# Patient Record
Sex: FEMALE | Race: WHITE | NOT HISPANIC OR LATINO | Employment: OTHER | URBAN - METROPOLITAN AREA
[De-identification: names, ages, dates, MRNs, and addresses within clinical notes are randomized per-mention and may not be internally consistent; named-entity substitution may affect disease eponyms.]

---

## 2023-04-05 LAB
CALCIDIOL (25 OH VITAMIN D3) (NG/ML) IN SER/PLAS: 41 NG/ML
CHOLESTEROL (MG/DL) IN SER/PLAS: 145 MG/DL (ref 0–199)
CHOLESTEROL IN HDL (MG/DL) IN SER/PLAS: 60.5 MG/DL
CHOLESTEROL/HDL RATIO: 2.4
LDL: 72 MG/DL (ref 0–99)
TRIGLYCERIDE (MG/DL) IN SER/PLAS: 63 MG/DL (ref 0–149)
VLDL: 13 MG/DL (ref 0–40)

## 2023-10-24 PROBLEM — Q79.0 BOCHDALEK HERNIA (HHS-HCC): Status: ACTIVE | Noted: 2023-10-24

## 2023-10-24 PROBLEM — M85.80 OSTEOPENIA: Status: ACTIVE | Noted: 2023-10-24

## 2023-10-24 PROBLEM — L82.1 OTHER SEBORRHEIC KERATOSIS: Status: ACTIVE | Noted: 2017-07-24

## 2023-10-24 PROBLEM — R20.0 NUMBNESS OF FOOT: Status: ACTIVE | Noted: 2023-10-24

## 2023-10-24 PROBLEM — D12.6 ADENOMATOUS COLON POLYP: Status: ACTIVE | Noted: 2023-10-24

## 2023-10-24 PROBLEM — D18.01 HEMANGIOMA OF SKIN AND SUBCUTANEOUS TISSUE: Status: ACTIVE | Noted: 2017-07-24

## 2023-10-24 PROBLEM — M76.821 POSTERIOR TIBIAL TENDON DYSFUNCTION (PTTD) OF RIGHT LOWER EXTREMITY: Status: ACTIVE | Noted: 2023-10-24

## 2023-10-24 PROBLEM — M21.611 HALLUX VALGUS WITH BUNIONS OF RIGHT FOOT: Status: ACTIVE | Noted: 2023-10-24

## 2023-10-24 PROBLEM — M20.11 HALLUX VALGUS WITH BUNIONS OF RIGHT FOOT: Status: ACTIVE | Noted: 2023-10-24

## 2023-10-24 PROBLEM — E78.5 HYPERLIPIDEMIA: Status: ACTIVE | Noted: 2023-10-24

## 2023-10-24 PROBLEM — M41.9 SCOLIOSIS: Status: ACTIVE | Noted: 2023-10-24

## 2023-10-24 PROBLEM — L57.9 SKIN CHANGES DUE TO CHRONIC EXPOSURE TO NONIONIZING RADIATION, UNSPECIFIED: Status: ACTIVE | Noted: 2017-07-24

## 2023-10-24 PROBLEM — R13.10 DYSPHAGIA: Status: ACTIVE | Noted: 2023-10-24

## 2023-10-24 PROBLEM — N28.1 RENAL CYST: Status: ACTIVE | Noted: 2023-10-24

## 2023-10-24 PROBLEM — K21.9 GERD (GASTROESOPHAGEAL REFLUX DISEASE): Status: ACTIVE | Noted: 2023-10-24

## 2023-10-24 PROBLEM — R41.89 COGNITIVE CHANGES: Status: ACTIVE | Noted: 2023-10-24

## 2023-10-24 RX ORDER — ATORVASTATIN CALCIUM 10 MG/1
1 TABLET, FILM COATED ORAL DAILY
COMMUNITY
Start: 2022-12-01 | End: 2024-03-25 | Stop reason: SDUPTHER

## 2023-10-24 RX ORDER — ONDANSETRON 4 MG/1
1-2 TABLET, ORALLY DISINTEGRATING ORAL EVERY 12 HOURS PRN
COMMUNITY
Start: 2022-05-24 | End: 2023-10-25 | Stop reason: ALTCHOICE

## 2023-10-24 RX ORDER — MULTIVITAMIN
1 TABLET ORAL DAILY
COMMUNITY

## 2023-10-24 RX ORDER — ONDANSETRON 4 MG/1
2 TABLET, FILM COATED ORAL EVERY 8 HOURS PRN
COMMUNITY

## 2023-10-24 RX ORDER — CALC/MAG/B COMPLEX/D3/HERB 61
15 TABLET ORAL DAILY
COMMUNITY

## 2023-10-24 RX ORDER — FAMOTIDINE 40 MG/1
40 TABLET, FILM COATED ORAL DAILY PRN
COMMUNITY
Start: 2019-10-29

## 2023-10-25 PROBLEM — N60.19 FIBROCYSTIC BREAST: Status: ACTIVE | Noted: 2023-10-25

## 2023-10-26 ENCOUNTER — OFFICE VISIT (OUTPATIENT)
Dept: PRIMARY CARE | Facility: CLINIC | Age: 73
End: 2023-10-26
Payer: COMMERCIAL

## 2023-10-26 VITALS
HEART RATE: 65 BPM | BODY MASS INDEX: 23.85 KG/M2 | DIASTOLIC BLOOD PRESSURE: 70 MMHG | TEMPERATURE: 97.9 F | WEIGHT: 130.4 LBS | SYSTOLIC BLOOD PRESSURE: 134 MMHG

## 2023-10-26 DIAGNOSIS — H91.90 HEARING DIFFICULTY, UNSPECIFIED LATERALITY: ICD-10-CM

## 2023-10-26 DIAGNOSIS — Z00.00 MEDICARE ANNUAL WELLNESS VISIT, SUBSEQUENT: ICD-10-CM

## 2023-10-26 DIAGNOSIS — Z12.31 ENCOUNTER FOR SCREENING MAMMOGRAM FOR MALIGNANT NEOPLASM OF BREAST: ICD-10-CM

## 2023-10-26 DIAGNOSIS — R13.10 DYSPHAGIA, UNSPECIFIED TYPE: ICD-10-CM

## 2023-10-26 DIAGNOSIS — E78.49 OTHER HYPERLIPIDEMIA: Primary | ICD-10-CM

## 2023-10-26 DIAGNOSIS — K21.9 GASTROESOPHAGEAL REFLUX DISEASE WITHOUT ESOPHAGITIS: ICD-10-CM

## 2023-10-26 DIAGNOSIS — M41.20 OTHER IDIOPATHIC SCOLIOSIS, UNSPECIFIED SPINAL REGION: ICD-10-CM

## 2023-10-26 DIAGNOSIS — N28.1 RENAL CYST: ICD-10-CM

## 2023-10-26 PROCEDURE — 1160F RVW MEDS BY RX/DR IN RCRD: CPT | Performed by: INTERNAL MEDICINE

## 2023-10-26 PROCEDURE — 90715 TDAP VACCINE 7 YRS/> IM: CPT | Performed by: INTERNAL MEDICINE

## 2023-10-26 PROCEDURE — 1126F AMNT PAIN NOTED NONE PRSNT: CPT | Performed by: INTERNAL MEDICINE

## 2023-10-26 PROCEDURE — 90471 IMMUNIZATION ADMIN: CPT | Performed by: INTERNAL MEDICINE

## 2023-10-26 PROCEDURE — 90472 IMMUNIZATION ADMIN EACH ADD: CPT | Performed by: INTERNAL MEDICINE

## 2023-10-26 PROCEDURE — 99397 PER PM REEVAL EST PAT 65+ YR: CPT | Performed by: INTERNAL MEDICINE

## 2023-10-26 PROCEDURE — 90662 IIV NO PRSV INCREASED AG IM: CPT | Performed by: INTERNAL MEDICINE

## 2023-10-26 PROCEDURE — 1159F MED LIST DOCD IN RCRD: CPT | Performed by: INTERNAL MEDICINE

## 2023-10-26 NOTE — PROGRESS NOTES
Subjective   Patient ID:   [id number]   [id number]   Claudia Maria is a 72 y.o. female who presents for her annual exam.    HPI     72 year old female here noting her son and his wife moved to [place] and they are expecting in April.  His wife has had some issues.  They moved to her father in law apartment at [facility].  Their daughter is doing well and has lost 30 pounds.  She will be going with her sister for a garden tour in June 2024.      How do you feel?  Marleny?  Trip to [place]?  Had a great time.    Caretaking of ?  Exercise?  She has been walking, less often than she prefers but back at it.  She takes a line dancing class at [facility] and is happy to have resumed after many years.  She also takes classes on Fridays at [facility].  They have encore 55 which basically means you can take up to 6 classes from 3 which is $6.00 per class. They go for 6 weeks.  She is taking What is Art?  She is taking a Caravaggio class and then 2 exercise classes.    She started to take prunes to help with bone health    ROS were reviewed and are negative with the exception of what is noted in HPI    There were no vitals taken for this visit.  Objective   Physical Exam  HENT:      Head: Normocephalic and atraumatic.      Right Ear: Tympanic membrane normal.      Left Ear: Tympanic membrane normal.      Mouth/Throat:      Mouth: Mucous membranes are moist.   Eyes:      Extraocular Movements: Extraocular movements intact.      Pupils: Pupils are equal, round, and reactive to light.   Cardiovascular:      Rate and Rhythm: Normal rate and regular rhythm.      Heart sounds: No murmur heard.     No friction rub. No gallop.   Pulmonary:      Effort: Pulmonary effort is normal.      Breath sounds: No stridor. No wheezing or rales.   Abdominal:      Palpations: Abdomen is soft.      Tenderness: There is no abdominal tenderness.   Musculoskeletal:         General: No swelling.      Cervical back: Neck supple.   Skin:     General: Skin  is warm and dry.   Neurological:      Mental Status: She is alert.         Assessment/Plan   Problem List Items Addressed This Visit    None      Provider Impressions     1. Esophageal spasm/GERD - omeprazole or famotidine for daily use PRN. EGD 2003, 2015 without evidence of esophageal disease. EGD 11/22 with only hiatal hernia.    2. BOCHDALEK HERNIA -her  discussed with Negrita and chose no therapy necessary.   3. Bunion, high arches, Mononeuropathy right foot - had frostbite in the past. She saw Madeleine in past who recommended orthotics to start.   4. Osteopenia - bone densitometry has progressed compared with 8/18 with FRAX score in the range she should be treated. Wanted her to start alendronate 70 mg weekly but she is reticent and has not started.  Referral to endocrine for extra input. Also encourage outdoor exercise, calcium intake and watch her vitamin D. REpeat dexa June 2024.  5.  Borderline BP - today BP is okay.  Encourage her to check occasionally.  Plan to treat if >130/80.  Encourage healthy behavior          6. Renal cyst - ultrasound 6/18 with 1.3 cm cyst with septa, Bosniak category II means no followup. SHe is followed by urology now.   7. Anxiety/SAD - mood better. Her daughter's happiness affects her own   8. Fibrocystic breast - mammograms regularly. LAst one 5/23.   9. Hearing loss - referral to audiology for regular exams.   10. Lung nodules, pleuro-parenchymal scarring - looks like old infection. Not high risk would not check further at this point   11. Family history of atherosclerotic disease, hyperlipidemia - coronary CT calcium score of ZERO 6/17!!!! On statin and tolerates well.   12. Family history of ovarian disease although at age 91 in mom. F/U PAP per GYN  13. Scoliosis  14. Sessile serrated adenoma - 2015 colonoscopy, repeat November 2022 with polyp and repeat recommended in 2029. Ondansetron helped with prep.   15. Palpitations - not bothering her. 3 runs highest 6  beats   16. Health maintenance   - immunizations - Prevnar 20 done, at pharmacy had flu, covid and rsv discussed  .- Colonoscopy 2022 repeat 2029,   - Mammogram 05/23, repeat ordered.   - Bone densitometry 8/18 and 6/2022. .      Tahira Franklin MD

## 2023-10-26 NOTE — PATIENT INSTRUCTIONS
Patient Discussion/Summary     1. Good to see you today  2. Another exciting trip ahead.   3. Lets have you talk to an endocrine doctor to talk about bone health  4. Repeat bone density June 2024.   4. Glad you are doing so well with the statin.   5. Your blood pressure was good today but reasonable to keep checking every now and then.  Our goal is to keep the pressure <130/80.   6. On exam today you look healthy with normal heart, lungs, some crunching of the knees and slight fluid bulge right knee  7. Please get your MONOVALENT COVID vaccine and also discuss the RSV with Chuckie  8. High dose flu and Tdap today  9.  Audiology referral  10. Lets see each other in one year unless you need me sooner.

## 2024-03-25 ENCOUNTER — TELEPHONE (OUTPATIENT)
Dept: PRIMARY CARE | Facility: CLINIC | Age: 74
End: 2024-03-25
Payer: COMMERCIAL

## 2024-03-25 DIAGNOSIS — E78.49 OTHER HYPERLIPIDEMIA: ICD-10-CM

## 2024-03-25 RX ORDER — ATORVASTATIN CALCIUM 10 MG/1
10 TABLET, FILM COATED ORAL DAILY
Qty: 90 TABLET | Refills: 2 | Status: SHIPPED | OUTPATIENT
Start: 2024-03-25

## 2024-06-10 ENCOUNTER — APPOINTMENT (OUTPATIENT)
Dept: OBSTETRICS AND GYNECOLOGY | Facility: CLINIC | Age: 74
End: 2024-06-10
Payer: COMMERCIAL

## 2024-06-27 ENCOUNTER — APPOINTMENT (OUTPATIENT)
Dept: OBSTETRICS AND GYNECOLOGY | Facility: CLINIC | Age: 74
End: 2024-06-27
Payer: COMMERCIAL

## 2024-06-27 VITALS
BODY MASS INDEX: 22.5 KG/M2 | DIASTOLIC BLOOD PRESSURE: 72 MMHG | SYSTOLIC BLOOD PRESSURE: 156 MMHG | HEIGHT: 63 IN | WEIGHT: 127 LBS

## 2024-06-27 DIAGNOSIS — Z01.419 NORMAL GYNECOLOGIC EXAMINATION: ICD-10-CM

## 2024-06-27 DIAGNOSIS — L29.2 VULVAR ITCHING: Primary | ICD-10-CM

## 2024-06-27 DIAGNOSIS — Z12.31 BREAST CANCER SCREENING BY MAMMOGRAM: ICD-10-CM

## 2024-06-27 PROCEDURE — 99214 OFFICE O/P EST MOD 30 MIN: CPT | Performed by: OBSTETRICS & GYNECOLOGY

## 2024-06-27 PROCEDURE — 1036F TOBACCO NON-USER: CPT | Performed by: OBSTETRICS & GYNECOLOGY

## 2024-06-27 PROCEDURE — 1126F AMNT PAIN NOTED NONE PRSNT: CPT | Performed by: OBSTETRICS & GYNECOLOGY

## 2024-06-27 PROCEDURE — 1159F MED LIST DOCD IN RCRD: CPT | Performed by: OBSTETRICS & GYNECOLOGY

## 2024-06-27 PROCEDURE — 1160F RVW MEDS BY RX/DR IN RCRD: CPT | Performed by: OBSTETRICS & GYNECOLOGY

## 2024-06-27 RX ORDER — CLOBETASOL PROPIONATE 0.5 MG/G
OINTMENT TOPICAL 2 TIMES DAILY
Qty: 15 G | Refills: 1 | Status: SHIPPED | OUTPATIENT
Start: 2024-06-27 | End: 2024-08-26

## 2024-06-27 ASSESSMENT — ENCOUNTER SYMPTOMS
ALLERGIC/IMMUNOLOGIC NEGATIVE: 1
MUSCULOSKELETAL NEGATIVE: 1
HEMATOLOGIC/LYMPHATIC NEGATIVE: 1
EYES NEGATIVE: 1
LOSS OF SENSATION IN FEET: 1
PSYCHIATRIC NEGATIVE: 1
NEUROLOGICAL NEGATIVE: 1
OCCASIONAL FEELINGS OF UNSTEADINESS: 0
CARDIOVASCULAR NEGATIVE: 1
RESPIRATORY NEGATIVE: 1
CONSTITUTIONAL NEGATIVE: 1
ENDOCRINE NEGATIVE: 1
GASTROINTESTINAL NEGATIVE: 1
DEPRESSION: 0

## 2024-06-27 ASSESSMENT — PAIN SCALES - GENERAL: PAINLEVEL: 0-NO PAIN

## 2024-06-27 NOTE — PROGRESS NOTES
Subjective   Patient ID: Claudia Maria is a 73 y.o. female who presents for Annual Exam (Last pap:  6/8/2023  neg/neg/Last giovani:  5/25/2023  cat 2/Last colon screen:  7/31/2018/Walter alvares.   Gabriela Roberts LPN).  HPI patient is here for annual visit she has no complaints except occasional irritation between on perineum between the rectum and vagina that comes and goes    Review of Systems   Constitutional: Negative.    Eyes: Negative.    Respiratory: Negative.     Cardiovascular: Negative.    Gastrointestinal: Negative.    Endocrine: Negative.    Genitourinary: Negative.    Musculoskeletal: Negative.    Skin: Negative.    Allergic/Immunologic: Negative.    Neurological: Negative.    Hematological: Negative.    Psychiatric/Behavioral: Negative.         Objective   Physical Exam  Constitutional:       Appearance: Normal appearance.   HENT:      Head: Normocephalic and atraumatic.   Cardiovascular:      Rate and Rhythm: Normal rate and regular rhythm.      Pulses: Normal pulses.      Heart sounds: Normal heart sounds.   Pulmonary:      Effort: Pulmonary effort is normal.      Breath sounds: Normal breath sounds.   Abdominal:      General: Abdomen is flat. Bowel sounds are normal.      Palpations: Abdomen is soft.      Hernia: There is no hernia in the left inguinal area or right inguinal area.   Genitourinary:     General: Normal vulva.      Exam position: Lithotomy position.      Labia:         Right: No rash, tenderness or lesion.         Left: No rash, tenderness or lesion.       Urethra: No prolapse.      Vagina: Normal.      Cervix: Normal.      Uterus: Normal.       Adnexa: Right adnexa normal and left adnexa normal.      Comments: Vagina atrophic  Musculoskeletal:      Cervical back: Normal range of motion and neck supple.   Skin:     General: Skin is warm and dry.   Neurological:      General: No focal deficit present.      Mental Status: She is alert and oriented to person, place, and time.          Assessment/Plan    vulvar lesion vulvar itching  Normal gynecologic examination  mammogram         Ant Yancey MD 06/27/24 11:36 AM

## 2024-07-03 ENCOUNTER — HOSPITAL ENCOUNTER (OUTPATIENT)
Dept: RADIOLOGY | Facility: HOSPITAL | Age: 74
Discharge: HOME | End: 2024-07-03
Payer: COMMERCIAL

## 2024-07-03 VITALS — BODY MASS INDEX: 22.5 KG/M2 | WEIGHT: 126.98 LBS | HEIGHT: 63 IN

## 2024-07-03 DIAGNOSIS — Z12.31 BREAST CANCER SCREENING BY MAMMOGRAM: ICD-10-CM

## 2024-07-03 PROCEDURE — 77067 SCR MAMMO BI INCL CAD: CPT

## 2024-11-07 ENCOUNTER — APPOINTMENT (OUTPATIENT)
Dept: PRIMARY CARE | Facility: CLINIC | Age: 74
End: 2024-11-07
Payer: COMMERCIAL

## 2024-11-11 ENCOUNTER — APPOINTMENT (OUTPATIENT)
Dept: PRIMARY CARE | Facility: CLINIC | Age: 74
End: 2024-11-11
Payer: COMMERCIAL

## 2024-11-12 ENCOUNTER — OFFICE VISIT (OUTPATIENT)
Dept: PRIMARY CARE | Facility: CLINIC | Age: 74
End: 2024-11-12
Payer: COMMERCIAL

## 2024-11-12 VITALS
SYSTOLIC BLOOD PRESSURE: 146 MMHG | HEIGHT: 62 IN | WEIGHT: 127 LBS | BODY MASS INDEX: 23.37 KG/M2 | HEART RATE: 68 BPM | DIASTOLIC BLOOD PRESSURE: 86 MMHG

## 2024-11-12 DIAGNOSIS — H91.90 HEARING DIFFICULTY, UNSPECIFIED LATERALITY: ICD-10-CM

## 2024-11-12 DIAGNOSIS — M85.80 OSTEOPENIA, UNSPECIFIED LOCATION: Primary | ICD-10-CM

## 2024-11-12 DIAGNOSIS — E78.49 OTHER HYPERLIPIDEMIA: ICD-10-CM

## 2024-11-12 DIAGNOSIS — K21.9 GASTROESOPHAGEAL REFLUX DISEASE, UNSPECIFIED WHETHER ESOPHAGITIS PRESENT: ICD-10-CM

## 2024-11-12 DIAGNOSIS — H53.9 VISUAL CHANGES: ICD-10-CM

## 2024-11-12 PROCEDURE — 3008F BODY MASS INDEX DOCD: CPT | Performed by: INTERNAL MEDICINE

## 2024-11-12 PROCEDURE — 1159F MED LIST DOCD IN RCRD: CPT | Performed by: INTERNAL MEDICINE

## 2024-11-12 PROCEDURE — 90471 IMMUNIZATION ADMIN: CPT | Performed by: INTERNAL MEDICINE

## 2024-11-12 PROCEDURE — 99397 PER PM REEVAL EST PAT 65+ YR: CPT | Performed by: INTERNAL MEDICINE

## 2024-11-12 PROCEDURE — 90662 IIV NO PRSV INCREASED AG IM: CPT | Performed by: INTERNAL MEDICINE

## 2024-11-12 RX ORDER — ATORVASTATIN CALCIUM 10 MG/1
10 TABLET, FILM COATED ORAL DAILY
Qty: 90 TABLET | Refills: 3 | Status: SHIPPED | OUTPATIENT
Start: 2024-11-12 | End: 2025-11-12

## 2024-11-12 RX ORDER — OMEPRAZOLE 20 MG/1
20 CAPSULE, DELAYED RELEASE ORAL DAILY
Qty: 30 CAPSULE | Refills: 6 | Status: SHIPPED | OUTPATIENT
Start: 2024-11-12 | End: 2025-11-12

## 2024-11-12 RX ORDER — CHOLECALCIFEROL (VITAMIN D3) 50 MCG
2000 TABLET ORAL DAILY
COMMUNITY

## 2024-11-12 NOTE — PROGRESS NOTES
Subjective   Patient ID:   [id number]   [id number]   Claudia Maria is a 73 y.o. female who presents for No chief complaint on file..  HPI    73 year old female here for annual exam.  She notes her son William is 7 months old.  She takes care of him on Wednesday and Thursday afternoon.  She takes him to the story hour at the [employer].  Her son and daughter in law live close by.  Her daughter in law is from [place] at [facility].  She feels good. She notes some aches and pains notably where she broke her left foot in the past.  She has a lot of night time leg cramps.  At home her blood pressure taken 3 time sin a row is okay.      ROS were reviewed and are negative with the exception of what is noted in HPI    There were no vitals taken for this visit.  Objective   Physical Exam  Vitals reviewed.   Constitutional:       General: She is not in acute distress.  HENT:      Head: Normocephalic.      Right Ear: Tympanic membrane normal.      Left Ear: Tympanic membrane normal.      Mouth/Throat:      Mouth: Mucous membranes are moist.   Eyes:      Extraocular Movements: Extraocular movements intact.      Pupils: Pupils are equal, round, and reactive to light.   Cardiovascular:      Rate and Rhythm: Normal rate and regular rhythm.      Heart sounds: No murmur heard.     No friction rub. No gallop.   Pulmonary:      Effort: Pulmonary effort is normal.      Breath sounds: No wheezing, rhonchi or rales.   Abdominal:      General: There is no distension.      Palpations: Abdomen is soft.      Tenderness: There is no abdominal tenderness. There is no guarding.   Musculoskeletal:         General: No swelling.      Cervical back: Neck supple.   Lymphadenopathy:      Cervical: No cervical adenopathy.   Neurological:      Mental Status: She is alert.         Assessment/Plan     #. Esophageal spasm/GERD - we discussed again use of omeprazole or famotidine or TUMS for daily use PRN. EGD 2003, 2015 without evidence of esophageal disease.  EGD 11/22 with only hiatal hernia.  Diet aggravates symptoms    #. BOCHDALEK HERNIA -seen by surgery and reviewed with Negrita and chose no therapy necessary.   #. Bunion, high arches, Mononeuropathy right foot - had frostbite in the past. She saw Madeleine in past who recommended orthotics to start.   #. Osteopenia - bone densitometry has progressed compared with 8/18 with FRAX score in the range she should be treated. Wanted her to start alendronate 70 mg weekly but she is reticent and has not started.  Referral to endocrine for extra input. Also encourage outdoor exercise, calcium intake and watch her vitamin D. REpeat dexa June 2024.  #.  Borderline BP - today BP is okay.  Encourage her to check occasionally.  Plan to treat if >130/80.  Encourage healthy behavior           # Renal cyst - ultrasound 6/18 with 1.3 cm cyst with septa, Samir category II means no followup. SHe is followed by urology now.   #. Anxiety/SAD - mood okay.  The election has not been helpful.  Her daughter's happiness affects her own.  It has been great to have her grandson local.     #. Fibrocystic breast - mammograms regularly. LAst one 7/24.  Has GYN to order  #. Hearing loss - encourage visit to audiology for regular exams.   #. Lung nodules, pleuro-parenchymal scarring - looks like old infection. Not high risk would not check further at this point   #. Family history of atherosclerotic disease, hyperlipidemia - coronary CT calcium score of ZERO 6/17!!!! On statin and tolerates well.   #. Family history of ovarian disease although at age 91 in mom. F/U PAP per GYN  #. Scoliosis  #. Sessile serrated adenoma - 2015 colonoscopy, repeat November 2022 with polyp and repeat recommended in 2029. Ondansetron helped with prep. Gave her a few to have on hand for other episodes of nausea.    #. Hyperlipidemia - ASCVD = 15% but 2017 with coranry calcium score zero.  Tolerates statin  #  Palpitation - prior evaluation showed supraventricular  tachycardia but cannot locate study or date.  Stress test 2023 and ECHO 2020.  ECHO review shows lv cavity slightly reduced and aortic diameter 3.9 with top normal supposedly 3.7.  Consider repeat ECHO to evaluate both again but no further symptoms.  Reassured.    #. Health maintenance   - immunizations - Prevnar 20 done, covid and rsv done too.    .- Colonoscopy 2022 repeat 2029,   - Mammogram 07/24  - Bone densitometry 8/18 and 6/2022. 7/24 with hesitancy about resuming treatment.  We discussed and she will consider visit with endo or rheum to discuss further.         Tahira Franklin MD

## 2024-11-12 NOTE — PATIENT INSTRUCTIONS
1. Good to see you today  2. For leg cramps a tough challenge.  First of all hydrate more than you think you need,  try drinking milk,  try adding tonic water with quinine and also tumeric we looked it up and try starting with 500 mg per day.    3. Lets have you talk to an endocrine doctor to talk about bone health  4. Repeat bone density ordered and referral to the endocrinologist to advise about other options to protect the bones beside the bisphosphonate.    5. Your blood pressure was good today.   6. On exam today you look healthy with normal heart, lungs, some fluid bulge on the right knee  7. High dose flu shot today.  COVID shot at the pharmacy  8. Labs ordered  9.  Audiology referral  10. Colonoscopy 2029  11.  Mammogram thanks was 7/24  12.  It has been a privilege and a pleasure to be your doctor

## 2024-12-27 ENCOUNTER — TELEPHONE (OUTPATIENT)
Dept: PRIMARY CARE | Facility: CLINIC | Age: 74
End: 2024-12-27

## 2024-12-27 ENCOUNTER — LAB (OUTPATIENT)
Dept: LAB | Facility: LAB | Age: 74
End: 2024-12-27
Payer: COMMERCIAL

## 2024-12-27 DIAGNOSIS — M85.80 OSTEOPENIA, UNSPECIFIED LOCATION: Primary | ICD-10-CM

## 2024-12-27 DIAGNOSIS — M85.80 OSTEOPENIA, UNSPECIFIED LOCATION: ICD-10-CM

## 2024-12-27 LAB
25(OH)D3 SERPL-MCNC: 36 NG/ML (ref 30–100)
ALBUMIN SERPL BCP-MCNC: 4.4 G/DL (ref 3.4–5)
ALP SERPL-CCNC: 70 U/L (ref 33–136)
ALT SERPL W P-5'-P-CCNC: 30 U/L (ref 7–45)
ANION GAP SERPL CALC-SCNC: 12 MMOL/L (ref 10–20)
AST SERPL W P-5'-P-CCNC: 33 U/L (ref 9–39)
BILIRUB SERPL-MCNC: 0.5 MG/DL (ref 0–1.2)
BUN SERPL-MCNC: 17 MG/DL (ref 6–23)
CALCIUM SERPL-MCNC: 9.4 MG/DL (ref 8.6–10.6)
CHLORIDE SERPL-SCNC: 104 MMOL/L (ref 98–107)
CHOLEST SERPL-MCNC: 156 MG/DL (ref 0–199)
CHOLESTEROL/HDL RATIO: 2.7
CO2 SERPL-SCNC: 33 MMOL/L (ref 21–32)
CREAT SERPL-MCNC: 0.75 MG/DL (ref 0.5–1.05)
EGFRCR SERPLBLD CKD-EPI 2021: 85 ML/MIN/1.73M*2
GLUCOSE SERPL-MCNC: 98 MG/DL (ref 74–99)
HDLC SERPL-MCNC: 56.9 MG/DL
LDLC SERPL CALC-MCNC: 81 MG/DL
NON HDL CHOLESTEROL: 99 MG/DL (ref 0–149)
POTASSIUM SERPL-SCNC: 4.5 MMOL/L (ref 3.5–5.3)
PROT SERPL-MCNC: 6.8 G/DL (ref 6.4–8.2)
SODIUM SERPL-SCNC: 144 MMOL/L (ref 136–145)
TRIGL SERPL-MCNC: 91 MG/DL (ref 0–149)
VLDL: 18 MG/DL (ref 0–40)

## 2024-12-27 PROCEDURE — 82306 VITAMIN D 25 HYDROXY: CPT

## 2024-12-27 PROCEDURE — 80053 COMPREHEN METABOLIC PANEL: CPT

## 2024-12-27 PROCEDURE — 80061 LIPID PANEL: CPT

## 2025-01-06 ENCOUNTER — CLINICAL SUPPORT (OUTPATIENT)
Dept: AUDIOLOGY | Facility: HOSPITAL | Age: 75
End: 2025-01-06
Payer: COMMERCIAL

## 2025-01-06 DIAGNOSIS — H90.3 SENSORINEURAL HEARING LOSS, BILATERAL: Primary | ICD-10-CM

## 2025-01-06 PROCEDURE — 92557 COMPREHENSIVE HEARING TEST: CPT

## 2025-01-06 PROCEDURE — 92550 TYMPANOMETRY & REFLEX THRESH: CPT | Mod: 52

## 2025-01-06 NOTE — PROGRESS NOTES
AUDIOLOGY ADULT EVALUATION      Name:  Claudia Maria   :  1950  Age:  74 y.o.  Date of Evaluation:  2025    Time: 14:00-14:25    IMPRESSIONS     Right ear: Normal middle ear functioning, normal hearing sensitivity 125-2000 Hz sloping to moderate sensorineural hearing loss through 8000 Hz and excellent (92%) word understanding at 60 dB HL.   Left ear: Normal middle ear functioning, normal hearing sensitivity 125-2000 Hz sloping to moderate sensorineural hearing loss through 8000 Hz and excellent (96%) word understanding at 60 dB HL.     Amplification needs: It was discussed that she is a borderline hearing aid candidate this time; Consider amplification if hearing loss begins affecting communication. Annual hearing tests are recommended in order to monitor.     RECOMMENDATIONS     Continue medical follow up with primary care provider and/or Ears Nose and Throat (ENT) provider as recommended.  Return for audiologic evaluation annually to monitor hearing sensitivity and assess middle ear status or sooner should concerns arise. The audiology department can be reached at [phone] to schedule an appointment.   Avoid exposure to loud sounds by moving away from the noise, turning down the volume, or wearing proper hearing protection correctly.  Appropriate communication techniques (face-to-face at a 4-6 foot maximum distance, reduced background noise, speech at normal volume and slower rate, good lighting, etc).     HISTORY     Ms. Maria, 74 y.o., was seen today for an initial audiologic evaluation at the request of Tahira Franklin MD, due to concerns for hearing sensitivity.     History obtained from patient report and chart review.     Change in Hearing:   Yes, in both ears; gradually over time. She feels that she will often mis-hear the beginnings or endings of words. Her kids have noted that she is not hearing as well as she once did.   Difficult listening environments:   In the presence of  ambient noise or background noise   Tinnitus:   Yes, in both ears; intermittent and non-bothersome; happens rarely  Otalgia: denied  Aural Pressure/Fullness: denied  Recent Ear Infections/Otorrhea: denied  Dizziness: denied  History of Ear Surgeries: denied  History of Noise Exposure: denied  Hearing Aid Use: none  Falls within the last year: denied    AUDIOGRAM         TEST RESULTS     Otoscopic Evaluation:  Right Ear: Non-occluding cerumen, tympanic membrane visualized.  Left Ear: Cerumen which appeared to be non-occluding; however, tympanic membrane could not be visualized. Testing continued given tympanometry results.    Tympanometry (226 Hz): This test is an objective evaluation of middle ear function. CPT code: 38858   Right Ear: Type Ad, middle ear pressure within normal limits and enhanced tympanic membrane compliance.   Left Ear: Type Ad, middle ear pressure within normal limits and enhanced tympanic membrane compliance.     Acoustic Reflexes: This test is an objective measure of auditory and facial nerve pathways.   (Probe) Right Ear (ipsi right stimulus ear; contralateral left stimulus ear): (Ipsilateral) Responses absent at 500-4000 Hz. Of note, upward deflections/artifact noted at each frequency.   (Probe) Left Ear (ipsi left stimulus ear; contralateral right stimulus ear): (Ipsilateral) Responses absent at 500-4000 Hz. Of note, upward deflections/artifact noted at each frequency.     Distortion Product Otoacoustic Emissions (DPOAE): This test is a measurement of responses which are generated by the cochlea when it is simultaneously stimulated by two pure tone frequencies. CPT code: 15213   Right Ear: Did not test.  Left Ear: Did not test.  Present OAEs suggest normal or near cochlear outer hair cell function for corresponding frequency region(s). Absent OAEs with normal middle ear function can be consistent with some degree of hearing loss. Assessment of cochlear outer hair cell function may be  impacted by outer or middle ear function.    Test technique: Conventional Audiometry via headphones. This test is an evaluation hearing sensitivity via air and bone conduction and speech recognition testing. CPT code: 46403  Reliability: good    Pure Tone Audiometry:     Right Ear: Hearing sensitivity within normal limits for 125-2000 Hz, sloping to 8000 Hz.  Left Ear: Hearing sensitivity within normal limits for 125-2000 Hz, sloping to 8000 Hz.    Speech Audiometry:   Right Ear: Speech Reception Threshold (SRT) was obtained at 20 dB HL. This is in good agreement with three frequency Pure Tone Average.   Word Recognition scores were excellent (92%) in quiet when words were presented at 60 dB HL. These results are based on [facility] Auditory Test No.6 (NU-6) Ordered by difficulty (N=10).  Left Ear: Speech Reception Threshold (SRT) was obtained at 20 dB HL. This is in good agreement with three frequency Pure Tone Average.   Word Recognition scores were excellent (96%) in quiet when words were presented at 60 dB HL. These results are based on [facility] Auditory Test No.6 (NU-6) Ordered by difficulty (N=10).     Comparison of today's results with previous test results: No previous results available.          DALILA Renae, CCC-A  Licensed Clinical Audiologist  Subdivision Lead Audiologist - [facility]     Degree of   Hearing Sensitivity dB Range   Within Normal Limits (WNL) 0 - 20   Slight 25   Mild 26 - 40   Moderate 41 - 55   Moderately-Severe 56 - 70   Severe 71 - 90   Profound 91 +     Key   CHL Conductive Hearing Loss   ECV Ear Canal Volume   HA Hearing Aid   NIHL Noise-Induced Hearing Loss   PTA Pure Tone Average   SNHL Sensorineural Hearing Loss   TM Tympanic Membrane   WNL Within Normal Limits

## 2025-01-27 DIAGNOSIS — E78.49 OTHER HYPERLIPIDEMIA: ICD-10-CM

## 2025-01-28 RX ORDER — ATORVASTATIN CALCIUM 10 MG/1
10 TABLET, FILM COATED ORAL DAILY
Qty: 90 TABLET | Refills: 3 | Status: SHIPPED | OUTPATIENT
Start: 2025-01-28 | End: 2026-01-28

## 2025-06-27 ENCOUNTER — APPOINTMENT (OUTPATIENT)
Dept: OBSTETRICS AND GYNECOLOGY | Facility: CLINIC | Age: 75
End: 2025-06-27
Payer: COMMERCIAL

## 2025-06-27 VITALS
WEIGHT: 121.2 LBS | DIASTOLIC BLOOD PRESSURE: 73 MMHG | HEIGHT: 63 IN | BODY MASS INDEX: 21.48 KG/M2 | SYSTOLIC BLOOD PRESSURE: 136 MMHG

## 2025-06-27 DIAGNOSIS — Q79.0 BOCHDALEK HERNIA (HHS-HCC): ICD-10-CM

## 2025-06-27 DIAGNOSIS — Z12.31 ENCOUNTER FOR SCREENING MAMMOGRAM FOR MALIGNANT NEOPLASM OF BREAST: ICD-10-CM

## 2025-06-27 DIAGNOSIS — Z12.31 SCREENING MAMMOGRAM FOR BREAST CANCER: Primary | ICD-10-CM

## 2025-06-27 PROCEDURE — 1126F AMNT PAIN NOTED NONE PRSNT: CPT | Performed by: OBSTETRICS & GYNECOLOGY

## 2025-06-27 PROCEDURE — 99214 OFFICE O/P EST MOD 30 MIN: CPT | Performed by: OBSTETRICS & GYNECOLOGY

## 2025-06-27 PROCEDURE — 1159F MED LIST DOCD IN RCRD: CPT | Performed by: OBSTETRICS & GYNECOLOGY

## 2025-06-27 PROCEDURE — 1160F RVW MEDS BY RX/DR IN RCRD: CPT | Performed by: OBSTETRICS & GYNECOLOGY

## 2025-06-27 PROCEDURE — 1036F TOBACCO NON-USER: CPT | Performed by: OBSTETRICS & GYNECOLOGY

## 2025-06-27 PROCEDURE — 3008F BODY MASS INDEX DOCD: CPT | Performed by: OBSTETRICS & GYNECOLOGY

## 2025-06-27 ASSESSMENT — ENCOUNTER SYMPTOMS
NEUROLOGICAL NEGATIVE: 1
ENDOCRINE NEGATIVE: 1
PSYCHIATRIC NEGATIVE: 1
ALLERGIC/IMMUNOLOGIC NEGATIVE: 1
MUSCULOSKELETAL NEGATIVE: 1
CARDIOVASCULAR NEGATIVE: 1
GASTROINTESTINAL NEGATIVE: 1
CONSTITUTIONAL NEGATIVE: 1
RESPIRATORY NEGATIVE: 1
HEMATOLOGIC/LYMPHATIC NEGATIVE: 1
EYES NEGATIVE: 1

## 2025-06-27 ASSESSMENT — PAIN SCALES - GENERAL: PAINLEVEL_OUTOF10: 0-NO PAIN

## 2025-06-27 ASSESSMENT — PATIENT HEALTH QUESTIONNAIRE - PHQ9
SUM OF ALL RESPONSES TO PHQ9 QUESTIONS 1 & 2: 0
1. LITTLE INTEREST OR PLEASURE IN DOING THINGS: NOT AT ALL
2. FEELING DOWN, DEPRESSED OR HOPELESS: NOT AT ALL

## 2025-06-27 NOTE — PROGRESS NOTES
Subjective   Patient ID: Claudia Maria is a 74 y.o. female who presents for Routine Gyn Exam (Last pap:  6/8/2023  neg/neg./Last giovani:  7/3/2024  cat 2./Last colon screen:  7/31/2018./Declines dia.   STEPHEN Roberts LPN).  HPI patient is here for annual visit she complains of some cramping in the mid abdomen no other associated symptoms    Review of Systems   Constitutional: Negative.    Eyes: Negative.    Respiratory: Negative.     Cardiovascular: Negative.    Gastrointestinal: Negative.    Endocrine: Negative.    Genitourinary: Negative.    Musculoskeletal: Negative.    Skin: Negative.    Allergic/Immunologic: Negative.    Neurological: Negative.    Hematological: Negative.    Psychiatric/Behavioral: Negative.         Objective   Physical Exam  Constitutional:       Appearance: Normal appearance.   HENT:      Head: Normocephalic and atraumatic.   Cardiovascular:      Rate and Rhythm: Normal rate and regular rhythm.      Pulses: Normal pulses.      Heart sounds: Normal heart sounds.   Pulmonary:      Effort: Pulmonary effort is normal.      Breath sounds: Normal breath sounds.   Abdominal:      General: Abdomen is flat. Bowel sounds are normal.      Palpations: Abdomen is soft.      Hernia: There is no hernia in the left inguinal area or right inguinal area.   Genitourinary:     General: Normal vulva.      Exam position: Lithotomy position.      Labia:         Right: No rash, tenderness or lesion.         Left: No rash, tenderness or lesion.       Urethra: No prolapse.      Vagina: Normal.      Cervix: Normal.      Uterus: Normal.       Adnexa: Right adnexa normal and left adnexa normal.   Musculoskeletal:      Cervical back: Normal range of motion and neck supple.   Skin:     General: Skin is warm and dry.   Neurological:      General: No focal deficit present.      Mental Status: She is alert and oriented to person, place, and time.         Assessment/Plan    normal gynecologic examination  Screening for breast  cancer         Ant Yancey MD 06/27/25 10:45 AM

## 2026-06-30 ENCOUNTER — APPOINTMENT (OUTPATIENT)
Facility: CLINIC | Age: 76
End: 2026-06-30
Payer: COMMERCIAL